# Patient Record
Sex: MALE | Race: WHITE | NOT HISPANIC OR LATINO | Employment: OTHER | URBAN - METROPOLITAN AREA
[De-identification: names, ages, dates, MRNs, and addresses within clinical notes are randomized per-mention and may not be internally consistent; named-entity substitution may affect disease eponyms.]

---

## 2021-01-15 DIAGNOSIS — Z23 NEED FOR VACCINATION: ICD-10-CM

## 2021-06-19 ENCOUNTER — HOSPITAL ENCOUNTER (EMERGENCY)
Facility: MEDICAL CENTER | Age: 76
End: 2021-06-20
Attending: EMERGENCY MEDICINE
Payer: COMMERCIAL

## 2021-06-19 ENCOUNTER — APPOINTMENT (OUTPATIENT)
Dept: RADIOLOGY | Facility: MEDICAL CENTER | Age: 76
End: 2021-06-19
Attending: EMERGENCY MEDICINE
Payer: COMMERCIAL

## 2021-06-19 VITALS
WEIGHT: 265 LBS | BODY MASS INDEX: 34.01 KG/M2 | DIASTOLIC BLOOD PRESSURE: 87 MMHG | OXYGEN SATURATION: 95 % | RESPIRATION RATE: 18 BRPM | SYSTOLIC BLOOD PRESSURE: 157 MMHG | TEMPERATURE: 98.1 F | HEART RATE: 80 BPM | HEIGHT: 74 IN

## 2021-06-19 DIAGNOSIS — R60.9 PERIPHERAL EDEMA: ICD-10-CM

## 2021-06-19 DIAGNOSIS — F10.10 ALCOHOL ABUSE: ICD-10-CM

## 2021-06-19 LAB
ANION GAP SERPL CALC-SCNC: 16 MMOL/L (ref 7–16)
BASOPHILS # BLD AUTO: 0.7 % (ref 0–1.8)
BASOPHILS # BLD: 0.03 K/UL (ref 0–0.12)
BUN SERPL-MCNC: 10 MG/DL (ref 8–22)
CALCIUM SERPL-MCNC: 8 MG/DL (ref 8.5–10.5)
CHLORIDE SERPL-SCNC: 96 MMOL/L (ref 96–112)
CO2 SERPL-SCNC: 22 MMOL/L (ref 20–33)
CREAT SERPL-MCNC: 0.71 MG/DL (ref 0.5–1.4)
EOSINOPHIL # BLD AUTO: 0.07 K/UL (ref 0–0.51)
EOSINOPHIL NFR BLD: 1.6 % (ref 0–6.9)
ERYTHROCYTE [DISTWIDTH] IN BLOOD BY AUTOMATED COUNT: 50.9 FL (ref 35.9–50)
ETHANOL BLD-MCNC: 348.5 MG/DL (ref 0–10)
GLUCOSE SERPL-MCNC: 82 MG/DL (ref 65–99)
HCT VFR BLD AUTO: 44.7 % (ref 42–52)
HGB BLD-MCNC: 15.3 G/DL (ref 14–18)
IMM GRANULOCYTES # BLD AUTO: 0.01 K/UL (ref 0–0.11)
IMM GRANULOCYTES NFR BLD AUTO: 0.2 % (ref 0–0.9)
LYMPHOCYTES # BLD AUTO: 1.73 K/UL (ref 1–4.8)
LYMPHOCYTES NFR BLD: 39.8 % (ref 22–41)
MCH RBC QN AUTO: 31.5 PG (ref 27–33)
MCHC RBC AUTO-ENTMCNC: 34.2 G/DL (ref 33.7–35.3)
MCV RBC AUTO: 92 FL (ref 81.4–97.8)
MONOCYTES # BLD AUTO: 0.49 K/UL (ref 0–0.85)
MONOCYTES NFR BLD AUTO: 11.3 % (ref 0–13.4)
NEUTROPHILS # BLD AUTO: 2.02 K/UL (ref 1.82–7.42)
NEUTROPHILS NFR BLD: 46.4 % (ref 44–72)
NRBC # BLD AUTO: 0 K/UL
NRBC BLD-RTO: 0 /100 WBC
NT-PROBNP SERPL IA-MCNC: 21 PG/ML (ref 0–125)
PLATELET # BLD AUTO: 137 K/UL (ref 164–446)
PMV BLD AUTO: 8 FL (ref 9–12.9)
POTASSIUM SERPL-SCNC: 4.2 MMOL/L (ref 3.6–5.5)
RBC # BLD AUTO: 4.86 M/UL (ref 4.7–6.1)
SODIUM SERPL-SCNC: 134 MMOL/L (ref 135–145)
WBC # BLD AUTO: 4.4 K/UL (ref 4.8–10.8)

## 2021-06-19 PROCEDURE — 85025 COMPLETE CBC W/AUTO DIFF WBC: CPT

## 2021-06-19 PROCEDURE — 83880 ASSAY OF NATRIURETIC PEPTIDE: CPT

## 2021-06-19 PROCEDURE — 80048 BASIC METABOLIC PNL TOTAL CA: CPT

## 2021-06-19 PROCEDURE — 82077 ASSAY SPEC XCP UR&BREATH IA: CPT

## 2021-06-19 PROCEDURE — 71045 X-RAY EXAM CHEST 1 VIEW: CPT

## 2021-06-19 PROCEDURE — 99285 EMERGENCY DEPT VISIT HI MDM: CPT

## 2021-06-19 RX ORDER — ONDANSETRON 4 MG/1
4 TABLET, ORALLY DISINTEGRATING ORAL EVERY 6 HOURS PRN
Qty: 10 TABLET | Refills: 0 | Status: SHIPPED | OUTPATIENT
Start: 2021-06-19

## 2021-06-20 NOTE — ED NOTES
Social work contacted for possible ride home as wife was contacted and unable to drive to come get patient. Taxi ride provided per ok with ERP as patient has walker at home.

## 2021-06-20 NOTE — ED PROVIDER NOTES
"ER Provider Note     Scribed for Christian Rose M.D. by Josue Cortez-Reyes. 6/19/2021, 9:41 PM.    Primary Care Provider: No primary care provider noted.  Means of Arrival: EMS   History obtained from: Patient  History limited by: None     CHIEF COMPLAINT  Chief Complaint   Patient presents with    Detox     via EMS for etoh detox, states drinks 1-2 pints of vodka/day plus \"some beers\", states wife took etoh away today, states last drink was around 5pm. reports hx of detoxing at home but unsuccessful.     Peripheral Edema     reports mild increase in LE edema, hx CHF, states was not taken meds for approx 1 week. denies any increase in SOB, reports has not gotten out of chair in 2 days       HPI  Ryley Thomas is a 75 y.o. male who presents to the Emergency Department for alcohol detoxication. The patient states that he last drank at around 5:00 PM today. He declares that he feels nauseated here at the ED. The patient notes that he is not on any oxygen support at home. He states that he has a history of congestive heart failure.    REVIEW OF SYSTEMS  See HPI for further details.     PAST MEDICAL HISTORY  No pertinent past medical history noted.    SURGICAL HISTORY  patient denies any surgical history    SOCIAL HISTORY  Social History     Tobacco Use    Smoking status: Never Smoker    Smokeless tobacco: Current User     Types: Chew   Substance Use Topics    Alcohol use: Yes     Comment: 1-2 pints/day & \"some beers\"    Drug use: Never      Social History     Substance and Sexual Activity   Drug Use Never       FAMILY HISTORY  History reviewed. No pertinent family history.    CURRENT MEDICATIONS  Home Medications    **Home medications have not yet been reviewed for this encounter**         ALLERGIES  Not noted.    PHYSICAL EXAM  VITAL SIGNS: /94   Pulse 80   Temp 36.6 °C (97.8 °F) (Temporal)   Resp 16   Ht 1.88 m (6' 2\")   Wt 120 kg (265 lb)   SpO2 91%   BMI 34.02 kg/m²      Constitutional: Alert. Patient is " tired appearing.  HENT: No signs of trauma, Bilateral external ears normal, Nose normal.   Eyes: Pupils are equal and reactive, Conjunctiva normal, Non-icteric.   Neck: Normal range of motion, No tenderness, Supple, No stridor.   Lymphatic: No lymphadenopathy noted.   Cardiovascular: Regular rate and rhythm, no palpable thrill  Thorax & Lungs: No respiratory distress,  No chest tenderness. Rales at base of lungs.  Abdomen: Bowel sounds normal, Soft, No tenderness, No masses, No pulsatile masses. No peritoneal signs.  Skin: Warm, Dry, No erythema, No rash.   Back: No bony tenderness, No CVA tenderness.   Extremities: Intact distal pulses, No edema, No tenderness, No cyanosis.  Musculoskeletal: Good range of motion in all major joints. No tenderness to palpation or major deformities noted.   Neurologic: Alert , Normal motor function, Normal sensory function, No focal deficits noted.   Psychiatric: Affect normal, Judgment normal, Mood normal.     DIAGNOSTIC STUDIES / PROCEDURES      LABS  Labs Reviewed   CBC WITH DIFFERENTIAL - Abnormal; Notable for the following components:       Result Value    WBC 4.4 (*)     RDW 50.9 (*)     Platelet Count 137 (*)     MPV 8.0 (*)     All other components within normal limits   BASIC METABOLIC PANEL - Abnormal; Notable for the following components:    Sodium 134 (*)     Calcium 8.0 (*)     All other components within normal limits   DIAGNOSTIC ALCOHOL - Abnormal; Notable for the following components:    Diagnostic Alcohol 348.5 (*)     All other components within normal limits   PROBRAIN NATRIURETIC PEPTIDE, NT   ESTIMATED GFR     All labs reviewed by me.    RADIOLOGY  DX-CHEST-PORTABLE (1 VIEW)   Final Result      Bibasilar linear opacities are greater on the left and likely represent atelectasis.         The radiologist's interpretation of all radiological studies have been reviewed by me.    COURSE & MEDICAL DECISION MAKING  Pertinent Labs & Imaging studies reviewed. (See chart  for details)    This is a 75 y.o. male that presents with alcohol abuse as well as swelling in the legs.  I will check the patient for acute congestive heart failure as well as electrolyte derangements and then reassess..     9:41 PM - Patient seen and examined at bedside. Ordered DX-Chest, CBC with differential, Basic Metabolic Panel, Diagnostic Alcohol, and proBrain Natriuretic Peptide, NT.     I reevaluated the patient at bedside. Patient is a he has no significant electrolyte derangements.  He has no significant anemia.  At this time we will send the patient home to his wife who is going to be able to care for him. I informed the patient of my plan to give him a prescription for Zofran. I discussed plan for discharge and follow up as outlined below. The patient verbalizes they feel comfortable going home. The patient is stable for discharge at this time and will return for any new or worsening symptoms. Patient verbalizes understanding and support with my plan for discharge.       The patient will return for new or worsening symptoms and is stable at the time of discharge.    DISPOSITION:  Patient will be discharged home in stable condition.    FOLLOW UP:  [address] [address] [phone]  Go in 2 days        OUTPATIENT MEDICATIONS:  New Prescriptions    ONDANSETRON (ZOFRAN ODT) 4 MG TABLET DISPERSIBLE    Take 1 tablet by mouth every 6 hours as needed.         FINAL IMPRESSION  1. Alcohol abuse    2. Peripheral edema          I, Josue Cortez-Reyes (Jany), am scribing for, and in the presence of, Christian Rose M.D..    Electronically signed by: Josue Cortez-Reyes (Jany), 6/19/2021    IChristian M.D. personally performed the services described in this documentation, as scribed by Josue Cortez-Reyes in my presence, and it is both accurate and complete.    The note accurately reflects work and decisions made by me.  Christian Rose M.D.  6/20/2021   1:22 AM

## 2021-06-20 NOTE — ED TRIAGE NOTES
"Chief Complaint   Patient presents with   • Detox     via EMS for etoh detox, states drinks 1-2 pints of vodka/day plus \"some beers\", states wife took etoh away today, states last drink was around 5pm. reports hx of detoxing at home but unsuccessful.    • Peripheral Edema     reports mild increase in LE edema, hx CHF, states was not taken meds for approx 1 week. denies any increase in SOB, reports has not gotten out of chair in 2 days     PT BIB [facility] for above complaints. [facility] to [facility] but was diverted here. Pt reports nausea at this time. Denies any other complaints.     /94   Pulse 80   Temp 36.6 °C (97.8 °F) (Temporal)   Resp 16   Ht 1.88 m (6' 2\")   Wt 120 kg (265 lb)   SpO2 91%   BMI 34.02 kg/m²     "

## 2021-06-20 NOTE — ED NOTES
"Pt sats decreased to 79% when sleeping, pt placed on 3L NC, quickly increased to >90%, pt resting. Pt denies any home O2 use. States was given a cpap at one point, but \"couldn't do it\". Will continue to monitor.   "

## 2021-06-20 NOTE — ED NOTES
DC home with written and verbal instructions regarding f/u, activity and RX. Verbalized understanding, WC out with all belongings to taxi.